# Patient Record
Sex: MALE | Race: OTHER | HISPANIC OR LATINO | URBAN - METROPOLITAN AREA
[De-identification: names, ages, dates, MRNs, and addresses within clinical notes are randomized per-mention and may not be internally consistent; named-entity substitution may affect disease eponyms.]

---

## 2018-03-29 ENCOUNTER — EMERGENCY (EMERGENCY)
Facility: HOSPITAL | Age: 28
LOS: 0 days | Discharge: HOME | End: 2018-03-29

## 2018-03-29 VITALS
OXYGEN SATURATION: 99 % | RESPIRATION RATE: 20 BRPM | HEART RATE: 70 BPM | SYSTOLIC BLOOD PRESSURE: 125 MMHG | DIASTOLIC BLOOD PRESSURE: 65 MMHG | TEMPERATURE: 98 F

## 2018-03-29 DIAGNOSIS — K21.9 GASTRO-ESOPHAGEAL REFLUX DISEASE WITHOUT ESOPHAGITIS: ICD-10-CM

## 2018-03-29 DIAGNOSIS — M54.5 LOW BACK PAIN: ICD-10-CM
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
## 2018-03-29 DIAGNOSIS — Z98.890 OTHER SPECIFIED POSTPROCEDURAL STATES: Chronic | ICD-10-CM
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
## 2018-03-29 RX ORDER — METHOCARBAMOL 500 MG/1
1 TABLET, FILM COATED ORAL
Qty: 14 | Refills: 0 | OUTPATIENT
Start: 2018-03-29 | End: 2018-04-04

## 2018-03-29 NOTE — ED PROVIDER NOTE - OBJECTIVE STATEMENT
pt with lower back pain radiating down b/l legs (alternating)  works in kitchen, stands for several hrs daily  h/o back problems- herniated discs- sx x 2 in [place]  Denies incontinence/numbness/saddle parathesia/legs weakness and difficulty on ambulation. denies fever/chill/HA/dizziness/chest pain/sob/abd pain/n/v/d/ urinary sxs

## 2018-03-29 NOTE — ED PROVIDER NOTE - NS ED ROS FT
Constitutional: no fever, chills, no recent weight loss, change in appetite or malaise  Eyes: no redness/discharge/pain/vision changes  ENT: no rhinorrhea/ear pain/sore throat  Cardiac: No chest pain, SOB or edema.  Respiratory: No cough or respiratory distress  GI: No nausea, vomiting, diarrhea or abdominal pain.  : No dysuria, frequency, urgency or hematuria  MS: no pain to extremities, no loss of ROM, no weakness  Neuro: No headache or weakness. No LOC.  Skin: No skin rash.  Endocrine: No history of thyroid disease or diabetes.  Except as documented in the HPI, all other systems are negative.

## 2018-03-29 NOTE — ED ADULT NURSE NOTE - OBJECTIVE STATEMENT
Pt c/o Left lower back pain and b/L LE and hip pain after lifting heavy object at work. hx of herniated disc surgery

## 2018-03-29 NOTE — ED PROVIDER NOTE - PROGRESS NOTE DETAILS
c/w mechanical back pain.  no neuro red flags.  sx are reproducible with position. aorta/cardiac not supported.  imaging not indicated at this time.  r/b of antinflamm dw pt.    r/b of narcotic meds dw pt  Discussed side effects from NSAIDs and muscle relaxant  Risk GI upset/gastritis/GERD from NSAIDs. May take OTC Prilosec.  Advised not to drive or operate heavy machinery while on Flexeril due to sedation risk. May take only at night if sedation occurs. translation used with RN at same time, see RN note re:  info

## 2018-04-03 PROBLEM — K21.9 GASTRO-ESOPHAGEAL REFLUX DISEASE WITHOUT ESOPHAGITIS: Chronic | Status: ACTIVE | Noted: 2018-03-29

## 2018-04-04 ENCOUNTER — OUTPATIENT (OUTPATIENT)
Dept: OUTPATIENT SERVICES | Facility: HOSPITAL | Age: 28
LOS: 1 days | Discharge: HOME | End: 2018-04-04

## 2018-04-04 ENCOUNTER — APPOINTMENT (OUTPATIENT)
Dept: ORTHOPEDIC SURGERY | Facility: CLINIC | Age: 28
End: 2018-04-04

## 2018-04-04 DIAGNOSIS — K21.9 GASTRO-ESOPHAGEAL REFLUX DISEASE W/OUT ESOPHAGITIS: ICD-10-CM

## 2018-04-04 DIAGNOSIS — Z98.890 OTHER SPECIFIED POSTPROCEDURAL STATES: Chronic | ICD-10-CM

## 2018-04-04 DIAGNOSIS — M54.9 DORSALGIA, UNSPECIFIED: ICD-10-CM

## 2018-04-04 PROBLEM — Z00.00 ENCOUNTER FOR PREVENTIVE HEALTH EXAMINATION: Status: ACTIVE | Noted: 2018-04-04

## 2018-05-28 ENCOUNTER — EMERGENCY (EMERGENCY)
Facility: HOSPITAL | Age: 28
LOS: 0 days | Discharge: HOME | End: 2018-05-29
Admitting: PHYSICIAN ASSISTANT

## 2018-05-28 VITALS
HEART RATE: 85 BPM | SYSTOLIC BLOOD PRESSURE: 132 MMHG | RESPIRATION RATE: 18 BRPM | TEMPERATURE: 99 F | DIASTOLIC BLOOD PRESSURE: 83 MMHG | OXYGEN SATURATION: 97 %

## 2018-05-28 DIAGNOSIS — M54.9 DORSALGIA, UNSPECIFIED: ICD-10-CM

## 2018-05-28 DIAGNOSIS — Z98.890 OTHER SPECIFIED POSTPROCEDURAL STATES: Chronic | ICD-10-CM

## 2018-05-28 DIAGNOSIS — Z79.899 OTHER LONG TERM (CURRENT) DRUG THERAPY: ICD-10-CM

## 2018-05-28 DIAGNOSIS — R20.0 ANESTHESIA OF SKIN: ICD-10-CM

## 2018-05-28 DIAGNOSIS — R20.2 PARESTHESIA OF SKIN: ICD-10-CM

## 2018-05-29 RX ORDER — KETOROLAC TROMETHAMINE 30 MG/ML
30 SYRINGE (ML) INJECTION ONCE
Qty: 0 | Refills: 0 | Status: DISCONTINUED | OUTPATIENT
Start: 2018-05-28 | End: 2018-05-28

## 2018-05-29 RX ORDER — METHOCARBAMOL 500 MG/1
1000 TABLET, FILM COATED ORAL ONCE
Qty: 0 | Refills: 0 | Status: COMPLETED | OUTPATIENT
Start: 2018-05-28 | End: 2018-05-28

## 2018-05-29 RX ORDER — TIZANIDINE 4 MG/1
1 TABLET ORAL
Qty: 8 | Refills: 0 | OUTPATIENT
Start: 2018-05-29 | End: 2018-06-01

## 2018-05-29 RX ADMIN — METHOCARBAMOL 1000 MILLIGRAM(S): 500 TABLET, FILM COATED ORAL at 00:11

## 2018-05-29 RX ADMIN — Medication 30 MILLIGRAM(S): at 00:10

## 2018-05-29 NOTE — ED PROVIDER NOTE - NS ED ROS FT
Except as documented in HPI, all other ROS negative.   GENERAL: Denies fever/chills, loss of appetite/weight or fatigue.  SKIN: Denies rashes, abrasions, lacerations, ecchymosis, erythema, or edema.  CARDIAC: Denies chest pain, palpitations, or SOB.   RESPIRATORY: Denies SOB, cough, hemoptysis or wheezing.   GI: Denies abdominal pain, n/v/d.   : Denies hematuria, dysuria or frequency.   MSK: + back pain.   NEURO: Denies paresthesias, tingling or weakness.

## 2018-05-29 NOTE — ED PROVIDER NOTE - PHYSICAL EXAMINATION
VITAL SIGNS: I have reviewed the initial vital signs.   CONSTITUTIONAL: Awake, alert. Well-developed; well-nourished; in no distress. Non-toxic appearing.   SKIN: No rash, vesicles/lesion, abrasions or lacerations. No ecchymosis or signs of trauma. Cap refill < 2 secs.   HEAD: Normocephalic; atraumatic. No step offs or tenderness. No Ibarra Sign’s or Raccoon eyes.   CARD: No chest wall deformity or tenderness. S1, S2 normal; no murmurs, gallops, or rubs. Regular rate and rhythm.  RESP: Good air movement. Lungs CTAB. No crackles, wheezes, rales or rhonchi.  ABD: Soft; non-distended; non-tender.   EXT: No bony deformity or tenderness. Normal ROM x 4 extremities. No midline spinal ttp. + paraspinal ttp.   NEURO: A&Ox3. GCS 15. Normal speech. CN 2-12 intact. Strength 5/5 UE/LE b/l. No sensory deficits. n/v intact UE/LE b/l, pulses symmetrical. Normal gait.  PSYCH: Cooperative, appropriate.

## 2018-05-29 NOTE — ED PROVIDER NOTE - PROGRESS NOTE DETAILS
Clerk Lou assisted with translation. Pt's urine dip negative. Given Toradol and Robaxin in ED. Script for Robaxin sent to pharmacy. Pt given rehab clinic & neurology f/u. Signs & symptoms which should prompy return discussed at length and verbalized agreement.

## 2018-05-29 NOTE — ED PROVIDER NOTE - OBJECTIVE STATEMENT
Pt is a 28 y/o Male, PMHX of chronic back pain, presents to ED with back pain. Pt reports 2 years ago had sx for herniated disc in back, had flare up of back pain one month ago, has been relieved with Ibuprofen. Today pt had pain with numbness/tingling down legs, has not taken any meds since early yesterday. Denies fever/chills, trauma/injury, urinary/fecal incontinence, abdominal pain, n/v/d, urinary symptoms, weakness, hx of kidney stones.

## 2018-07-17 ENCOUNTER — APPOINTMENT (OUTPATIENT)
Dept: NEUROLOGY | Facility: CLINIC | Age: 28
End: 2018-07-17

## 2019-02-13 NOTE — ED PROVIDER NOTE - SKIN, MLM
Skin normal color for race, warm, dry and intact. No evidence of rash.
pt presenting with epigastric burning that is associated with intermittent palpitations.  No CP reported.  Called PMD who stated would see patient in morning but symptoms returned and called PMD again and was told to come to the ED    Gen: Well appearing in NAD  Head: NC/AT  Neck: trachea midline  Resp:  No distress  Ext: no deformities  Neuro:  A&O appears non focal  Skin:  Warm and dry as visualized  Psych:  Normal affect and mood     Pt with what appears to be intermittent palps.  EKG here not remarkable.  Will get labs to look for etiology of symptoms.  Not consistent with an ACS.  If labs negative and remains HD stable here will get in contact with his PMD/Cardiologist for possible holter vs admission

## 2020-10-12 NOTE — ED ADULT NURSE NOTE - CHIEF COMPLAINT
Patient Education        Insomnia: Care Instructions  Your Care Instructions     Insomnia is the inability to sleep well. It is a common problem for most people at some time. Insomnia may make it hard for you to get to sleep, stay asleep, or sleep as long as you need to. This can make you tired and grouchy during the day. It can also make you forgetful, less effective at work, and unhappy. Insomnia can be caused by conditions such as depression or anxiety. Pain can also affect your ability to sleep. When these problems are solved, the insomnia usually clears up. But sometimes bad sleep habits can cause insomnia. If insomnia is affecting your work or your enjoyment of life, you can take steps to improve your sleep. Follow-up care is a key part of your treatment and safety. Be sure to make and go to all appointments, and call your doctor if you are having problems. It's also a good idea to know your test results and keep a list of the medicines you take. How can you care for yourself at home? What to avoid   · Do not have drinks with caffeine, such as coffee or black tea, for 8 hours before bed. · Do not smoke or use other types of tobacco near bedtime. Nicotine is a stimulant and can keep you awake. · Avoid drinking alcohol late in the evening, because it can cause you to wake in the middle of the night. · Do not eat a big meal close to bedtime. If you are hungry, eat a light snack. · Do not drink a lot of water close to bedtime, because the need to urinate may wake you up during the night. · Do not read or watch TV in bed. Use the bed only for sleeping and sexual activity. What to try   · Go to bed at the same time every night, and wake up at the same time every morning. Do not take naps during the day. · Keep your bedroom quiet, dark, and cool. · Sleep on a comfortable pillow and mattress. · If watching the clock makes you anxious, turn it facing away from you so you cannot see the time.   · If you worry when you lie down, start a worry book. Well before bedtime, write down your worries, and then set the book and your concerns aside. · Try meditation or other relaxation techniques before you go to bed. · If you cannot fall asleep, get up and go to another room until you feel sleepy. Do something relaxing. Repeat your bedtime routine before you go to bed again. · Make your house quiet and calm about an hour before bedtime. Turn down the lights, turn off the TV, log off the computer, and turn down the volume on music. This can help you relax after a busy day. When should you call for help? Watch closely for changes in your health, and be sure to contact your doctor if:    · Your efforts to improve your sleep do not work.     · Your insomnia gets worse.     · You have been feeling down, depressed, or hopeless or have lost interest in things that you usually enjoy. Where can you learn more? Go to [email or web address] and sign in to your [employer] account. Enter P513 in the [place] box to learn more about \"Insomnia: Care Instructions. \"     If you do not have an account, please click on the \"Sign Up Now\" link. Current as of: December 16, 2019               Content Version: 12.6  © 5592-3802 [employer]. Care instructions adapted under license by [facility] ([facility]). If you have questions about a medical condition or this instruction, always ask your healthcare professional. [place] [address] any warranty or liability for your use of this information. The patient is a 27y Male complaining of back pain/injury.